# Patient Record
Sex: FEMALE | HISPANIC OR LATINO | URBAN - METROPOLITAN AREA
[De-identification: names, ages, dates, MRNs, and addresses within clinical notes are randomized per-mention and may not be internally consistent; named-entity substitution may affect disease eponyms.]

---

## 2023-01-26 ENCOUNTER — EMERGENCY (EMERGENCY)
Facility: HOSPITAL | Age: 68
LOS: 1 days | Discharge: HOME | End: 2023-01-26
Attending: EMERGENCY MEDICINE
Payer: MEDICARE

## 2023-01-26 VITALS
OXYGEN SATURATION: 97 % | HEART RATE: 53 BPM | HEIGHT: 63 IN | TEMPERATURE: 99 F | RESPIRATION RATE: 16 BRPM | SYSTOLIC BLOOD PRESSURE: 159 MMHG | WEIGHT: 128.09 LBS | DIASTOLIC BLOOD PRESSURE: 63 MMHG

## 2023-01-26 VITALS
TEMPERATURE: 98 F | SYSTOLIC BLOOD PRESSURE: 150 MMHG | RESPIRATION RATE: 17 BRPM | OXYGEN SATURATION: 98 % | DIASTOLIC BLOOD PRESSURE: 70 MMHG | HEART RATE: 59 BPM

## 2023-01-26 LAB
ALBUMIN SERPL ELPH-MCNC: 4.2 G/DL — SIGNIFICANT CHANGE UP (ref 3.3–5)
ALP SERPL-CCNC: 88 U/L — SIGNIFICANT CHANGE UP (ref 40–120)
ALT FLD-CCNC: 10 U/L — SIGNIFICANT CHANGE UP (ref 10–45)
ANION GAP SERPL CALC-SCNC: 12 MMOL/L — SIGNIFICANT CHANGE UP (ref 5–17)
APTT BLD: 28.8 SEC — SIGNIFICANT CHANGE UP (ref 27.5–35.5)
AST SERPL-CCNC: 18 U/L — SIGNIFICANT CHANGE UP (ref 10–40)
BASOPHILS # BLD AUTO: 0.03 K/UL — SIGNIFICANT CHANGE UP (ref 0–0.2)
BASOPHILS NFR BLD AUTO: 0.6 % — SIGNIFICANT CHANGE UP (ref 0–2)
BILIRUB SERPL-MCNC: 0.2 MG/DL — SIGNIFICANT CHANGE UP (ref 0.2–1.2)
BLD GP AB SCN SERPL QL: NEGATIVE — SIGNIFICANT CHANGE UP
BUN SERPL-MCNC: 8 MG/DL — SIGNIFICANT CHANGE UP (ref 7–23)
CALCIUM SERPL-MCNC: 9.7 MG/DL — SIGNIFICANT CHANGE UP (ref 8.4–10.5)
CHLORIDE SERPL-SCNC: 105 MMOL/L — SIGNIFICANT CHANGE UP (ref 96–108)
CO2 SERPL-SCNC: 22 MMOL/L — SIGNIFICANT CHANGE UP (ref 22–31)
CREAT SERPL-MCNC: 0.6 MG/DL — SIGNIFICANT CHANGE UP (ref 0.5–1.3)
EGFR: 100 ML/MIN/1.73M2 — SIGNIFICANT CHANGE UP
EOSINOPHIL # BLD AUTO: 0.07 K/UL — SIGNIFICANT CHANGE UP (ref 0–0.5)
EOSINOPHIL NFR BLD AUTO: 1.4 % — SIGNIFICANT CHANGE UP (ref 0–6)
FLUAV AG NPH QL: SIGNIFICANT CHANGE UP
FLUBV AG NPH QL: SIGNIFICANT CHANGE UP
GLUCOSE SERPL-MCNC: 111 MG/DL — HIGH (ref 70–99)
HCT VFR BLD CALC: 37.5 % — SIGNIFICANT CHANGE UP (ref 34.5–45)
HGB BLD-MCNC: 12.1 G/DL — SIGNIFICANT CHANGE UP (ref 11.5–15.5)
IMM GRANULOCYTES NFR BLD AUTO: 0.6 % — SIGNIFICANT CHANGE UP (ref 0–0.9)
INR BLD: 1.03 RATIO — SIGNIFICANT CHANGE UP (ref 0.88–1.16)
LYMPHOCYTES # BLD AUTO: 1.62 K/UL — SIGNIFICANT CHANGE UP (ref 1–3.3)
LYMPHOCYTES # BLD AUTO: 31.5 % — SIGNIFICANT CHANGE UP (ref 13–44)
MCHC RBC-ENTMCNC: 28.9 PG — SIGNIFICANT CHANGE UP (ref 27–34)
MCHC RBC-ENTMCNC: 32.3 GM/DL — SIGNIFICANT CHANGE UP (ref 32–36)
MCV RBC AUTO: 89.7 FL — SIGNIFICANT CHANGE UP (ref 80–100)
MONOCYTES # BLD AUTO: 0.36 K/UL — SIGNIFICANT CHANGE UP (ref 0–0.9)
MONOCYTES NFR BLD AUTO: 7 % — SIGNIFICANT CHANGE UP (ref 2–14)
NEUTROPHILS # BLD AUTO: 3.04 K/UL — SIGNIFICANT CHANGE UP (ref 1.8–7.4)
NEUTROPHILS NFR BLD AUTO: 58.9 % — SIGNIFICANT CHANGE UP (ref 43–77)
NRBC # BLD: 0 /100 WBCS — SIGNIFICANT CHANGE UP (ref 0–0)
PLATELET # BLD AUTO: 229 K/UL — SIGNIFICANT CHANGE UP (ref 150–400)
POTASSIUM SERPL-MCNC: 4.3 MMOL/L — SIGNIFICANT CHANGE UP (ref 3.5–5.3)
POTASSIUM SERPL-SCNC: 4.3 MMOL/L — SIGNIFICANT CHANGE UP (ref 3.5–5.3)
PROT SERPL-MCNC: 7 G/DL — SIGNIFICANT CHANGE UP (ref 6–8.3)
PROTHROM AB SERPL-ACNC: 11.9 SEC — SIGNIFICANT CHANGE UP (ref 10.5–13.4)
RBC # BLD: 4.18 M/UL — SIGNIFICANT CHANGE UP (ref 3.8–5.2)
RBC # FLD: 12.8 % — SIGNIFICANT CHANGE UP (ref 10.3–14.5)
RH IG SCN BLD-IMP: POSITIVE — SIGNIFICANT CHANGE UP
RSV RNA NPH QL NAA+NON-PROBE: SIGNIFICANT CHANGE UP
SARS-COV-2 RNA SPEC QL NAA+PROBE: SIGNIFICANT CHANGE UP
SODIUM SERPL-SCNC: 139 MMOL/L — SIGNIFICANT CHANGE UP (ref 135–145)
WBC # BLD: 5.15 K/UL — SIGNIFICANT CHANGE UP (ref 3.8–10.5)
WBC # FLD AUTO: 5.15 K/UL — SIGNIFICANT CHANGE UP (ref 3.8–10.5)

## 2023-01-26 PROCEDURE — 87637 SARSCOV2&INF A&B&RSV AMP PRB: CPT

## 2023-01-26 PROCEDURE — 86900 BLOOD TYPING SEROLOGIC ABO: CPT

## 2023-01-26 PROCEDURE — 85730 THROMBOPLASTIN TIME PARTIAL: CPT

## 2023-01-26 PROCEDURE — 80053 COMPREHEN METABOLIC PANEL: CPT

## 2023-01-26 PROCEDURE — 99284 EMERGENCY DEPT VISIT MOD MDM: CPT

## 2023-01-26 PROCEDURE — 93005 ELECTROCARDIOGRAM TRACING: CPT

## 2023-01-26 PROCEDURE — 86850 RBC ANTIBODY SCREEN: CPT

## 2023-01-26 PROCEDURE — 36415 COLL VENOUS BLD VENIPUNCTURE: CPT

## 2023-01-26 PROCEDURE — 85025 COMPLETE CBC W/AUTO DIFF WBC: CPT

## 2023-01-26 PROCEDURE — 85610 PROTHROMBIN TIME: CPT

## 2023-01-26 PROCEDURE — 86901 BLOOD TYPING SEROLOGIC RH(D): CPT

## 2023-01-26 NOTE — ED PROVIDER NOTE - PHYSICAL EXAMINATION
Gen: NAD, non-toxic appearing  Head: normal appearing  HEENT: normal conjunctiva  PERRL  Conjunctiva are normal appearing  CN III, IV, and VI are intact    there is a definite visual acuity over the medial upper and lower quadrants on the left-hand side.  Vision is otherwise intact, normal vision on the right eye.  Lung: no respiratory distress, speaking in full sentences     CV: warm and well perfused extremities  Abd: soft, non distended, non tender   MSK: no visible deformities  Neuro:   Alert and grossly oriented, following commands  CN II-XII intact  5/5 strength in x4 extremities   Intact sensation to light touch in x4 extremities  Finger to Nose testing normal  Skin: No terence rashes

## 2023-01-26 NOTE — ED PROVIDER NOTE - PATIENT PORTAL LINK FT
You can access the FollowMyHealth Patient Portal offered by [facility] by registering at the following website: [email or web address]. By joining [facility]’s FollowMyHealth portal, you will also be able to view your health information using other applications (apps) compatible with our system.

## 2023-01-26 NOTE — ED ADULT NURSE NOTE - NSIMPLEMENTINTERV_GEN_ALL_ED
Implemented All Universal Safety Interventions:  [place] to call system. Call bell, personal items and telephone within reach. Instruct patient to call for assistance. Room bathroom lighting operational. Non-slip footwear when patient is off stretcher. Physically safe environment: no spills, clutter or unnecessary equipment. Stretcher in lowest position, wheels locked, appropriate side rails in place.

## 2023-01-26 NOTE — ED PROVIDER NOTE - ATTENDING CONTRIBUTION TO CARE
------------ATTENDING NOTE------------  pt w/ family sent to ED by outpt Ophthalmology for concerns of 24 hrs of progressing L retinal detachment, pt c/o painless OS hemianopsia, c/w [facility] Ophthalmology on arrival -->  - Brent Blair MD   ---------------------------------------------- ------------ATTENDING NOTE------------  pt w/ family sent to ED by outpt Ophthalmology for concerns of 24 hrs of progressing L retinal detachment, pt c/o painless OS hemianopsia, c/w [facility] Ophthalmology on arrival --> medically cleared by labs / Hx, evaluated by Ophthalmology and scheduled for surgery tomorrow, multiple in depth dw pt and Ophthalmopathology about ddx, tx, unable for surgery today, return instructions.  - Brent Blair MD   ----------------------------------------------

## 2023-01-26 NOTE — ED PROVIDER NOTE - OBJECTIVE STATEMENT
67-year-old female, history of hypertension, presenting with a chief complaint of acute vision loss ongoing since yesterday morning.  Located over her left medial visual field.  Minimal headache associated with this, no other active symptoms.  No weakness or numbness is reported.  She was seen by her ophthalmologist this afternoon, who referred her here after finding evidence of retinal detachment.  She denies any chronic ocular disease, however she does see a ophthalmologist yearly.  Does not take any regular drops.  No history of eye surgery.  No allergies to medications.  She is on daily and enalapril, no other medications.

## 2023-01-26 NOTE — ED PROVIDER NOTE - NSFOLLOWUPINSTRUCTIONS_ED_ALL_ED_FT
*** MEDICALLY CLEARED FOR SURGERY ***    See Eye Clinic tomorrow for scheduled surgery -- call early to discuss.    No eating/drinking after midnight tonight.    See RETINAL DETACHMENT information and return instructions given to you.    Seek immediate medical care for new / worsening symptoms / concerns.

## 2023-01-26 NOTE — CONSULT NOTE ADULT - ASSESSMENT
Assessment and Recommendations:  67y female with a past medical history/ocular history of *** consulted for ***, found to have ***    Seen and discussed with ***.    Outpatient Follow-up: Patient should follow-up with his/her ophthalmologist or with [facility] [place] Department of Ophthalmology within 1 week of after discharge at:    [address]  [place] [address]  [phone]    Candice Reyna MD, PGY-2  Contact: [employer] Teams     Assessment and Recommendations:  67y female with a past medical history/ocular history of HTN consulted for concern for RD, found to have Mac on RD in the left eye.     #Macula-on retinal detachment, left eye  - Superotemporal tear with detachment from 10:00 to 4:00 seen on DFE  - Patient will need retinal surgery   - Please document medical clearance for surgery   - Please obtain covid vaccine  - Patient instructed to be NPO after midnight in preparation for likely OR tomorrow   - Patient given the option to come to [facility] clinic first thing in the morning or go back to [facility] where she already has an appointment scheduled for tomorrow morning. Patient plans to go to [facility] apt as that office is more convenient for her, and she already sees an ophthalmologist at [facility].  - Patient given phone number and address for [facility] [place] and instructed to call immediately if she is unable to be seen at [facility] for any reason.     Discussed with Dr. Martinez, PGY-4    Outpatient Follow-up: Patient should follow-up with his/her ophthalmologist or with [facility] Department of Ophthalmology within 1 week of after discharge at:    [address]  [place], [place] [id number] [phone]    Candice Reyna MD, PGY-2  Contact: [employer] Teams

## 2023-01-26 NOTE — ED ADULT NURSE NOTE - OBJECTIVE STATEMENT
Pt presents to the ED A&OX4 w daughter at bedside co sudden "dark spots" and "unable to see through" L eye x 10am. Hx htn. Pt endorses mild pain to L eye. Upon assessment, pt unable to open L eye. Pending optho consult. Denies fevers, chills, headaches, chest pain.

## 2023-01-26 NOTE — CONSULT NOTE ADULT - SUBJECTIVE AND OBJECTIVE BOX
[facility] [place] DEPARTMENT OF OPHTHALMOLOGY - INITIAL ADULT CONSULT  -----------------------------------------------------------------------------  Candice Reyna MD, PGY-2  Contact: TEAMS  -----------------------------------------------------------------------------    HPI:    Interval History: ***    PMH: ***  POcHx: denies surg/laser  FH: denies glc/amd  Social History: denies etoh/tobacco  Ophthalmic Medications: none  Allergies: NKDA    Review of Systems:  Constitutional: No fever, chills  Eyes: No blurry vision, flashes, floaters, FBS, erythema, discharge, double vision, OU  Neuro: No tremors  Cardiovascular: No chest pain, palpitations  Respiratory: No SOB, no cough  GI: No nausea, vomiting, abdominal pain  : No dysuria  Skin: no rash  Psych: no depression  Endocrine: no polyuria, polydipsia  Heme/lymph: no swelling    VITALS: T(C): 37 (01-26-23 @ 18:29)  T(F): 98.6 (01-26-23 @ 18:29), Max: 98.6 (01-26-23 @ 18:29)  HR: 53 (01-26-23 @ 18:29) (53 - 53)  BP: 159/63 (01-26-23 @ 18:29) (159/63 - 159/63)  RR:  (16 - 16)  SpO2:  (97% - 97%)  Wt(kg): --  General: AAO x 3, appropriate mood and affect    Ophthalmology Exam:  Visual acuity (sc): 20/20 OU  Pupils: PERRL OU, no APD  Ttono: 16 OU  Extraocular movements (EOMs): Full OU, no pain, no diplopia  Confrontational Visual Field (CVF): Full OU  Color Plates: 12/12 OU    Pen Light Exam (PLE)  External: Flat OU  Lids/Lashes/Lacrimal Ducts: Flat OU    Sclera/Conjunctiva: W+Q OU  Cornea: Cl OU  Anterior Chamber: D+F OU    Iris: Flat OU  Lens: Cl OU    Fundus Exam: dilated with 1% tropicamide and 2.5% phenylephrine  Approval obtained from primary team for dilation  Patient aware that pupils can remained dilated for at least 4-6 hours  Exam performed with 20D lens    Vitreous: wnl OU  Disc, cup/disc: sharp and pink, 0.4 OU  Macula: wnl OU  Vessels: wnl OU  Periphery: wnl OU    Labs/Imaging:  *** [facility] DEPARTMENT OF OPHTHALMOLOGY - INITIAL ADULT CONSULT  -----------------------------------------------------------------------------  Candice Reyna MD, PGY-2  Contact: TEAMS  -----------------------------------------------------------------------------    HPI: 67-year-old female, history of hypertension, presenting with a chief complaint of acute vision loss ongoing since yesterday morning.  Located over her left medial visual field.  Minimal headache associated with this, no other active symptoms.  No weakness or numbness is reported.  She was seen by her ophthalmologist this afternoon, who referred her here after finding evidence of retinal detachment.  She denies any chronic ocular disease, however she does see a ophthalmologist yearly.  Does not take any regular drops.  No history of eye surgery.  No allergies to medications.  She is on daily and enalapril, no other medications.    Interval History: The patient first noticed black shadows in her left eye on 1/25/23 in the morning. The shadows persisted throughout the day. In the evening the patient noticed a "black crescent" in her left nasal visual field. In the morning on 1/26/23 the crescent had increased in size. She denies any flashes. She denies any symptoms in her right eye.     PMH: HTN  POcHx: denies surg/laser  FH: denies glc/amd  Social History: denies etoh/tobacco  Ophthalmic Medications: none  Allergies: NKDA    Review of Systems:  Constitutional: No fever, chills  Eyes: See Interval history  Neuro: No tremors  Cardiovascular: No chest pain, palpitations  Respiratory: No SOB, no cough  GI: No nausea, vomiting, abdominal pain  : No dysuria  Skin: no rash  Psych: no depression  Endocrine: no polyuria, polydipsia  Heme/lymph: no swelling    VITALS: T(C): 37 (01-26-23 @ 18:29)  T(F): 98.6 (01-26-23 @ 18:29), Max: 98.6 (01-26-23 @ 18:29)  HR: 53 (01-26-23 @ 18:29) (53 - 53)  BP: 159/63 (01-26-23 @ 18:29) (159/63 - 159/63)  RR:  (16 - 16)  SpO2:  (97% - 97%)  Wt(kg): --  General: AAO x 3, appropriate mood and affect    Ophthalmology Exam:  Visual acuity (sc): 20/25 OD, 20/50 OS, eccentrically.   Pupils: PERRL OU, no APD  Ttono: 14, 13  Extraocular movements (EOMs): Full OU, no pain, no diplopia  Confrontational Visual Field (CVF): Full OD. COnstricted inferonasally OS, otherwise full OS.  Color Plates: 12/12 OD, 10/12 OS    Slit lamp exam:  External: Flat OU  Lids/Lashes/Lacrimal Ducts: Flat OU    Sclera/Conjunctiva: W+Q OU  Cornea: Cl OU  Anterior Chamber: D+F OU    Iris: Flat OU  Lens: 1+ NS OD, 1+ NS and 1+ PSC OS    Fundus Exam: dilated with 1% tropicamide and 2.5% phenylephrine  Approval obtained from primary team for dilation  Patient aware that pupils can remained dilated for at least 4-6 hours  Exam performed with 20D lens    Vitreous: wnl OU  Disc, cup/disc: sharp and pink, 0.3 OU  Macula: flat OD. Possible subretinal fluid extending to fovea OS.   Vessels: wnl OU  Periphery: No holes, tears, detachments OD. Superotemporal tear with detachment extending from 10:00 to 4:00    Labs/Imaging:  None

## 2023-01-26 NOTE — ED PROVIDER NOTE - NS ED ROS FT
GENERAL: no fever  EYES: + L vision loss  HEENT: no neck pain  CARDIAC: no chest pain  PULMONARY: no SOB  GI: no abdominal pain  : no dysuria  SKIN: no rashes  NEURO: no headache  MSK: no new joint pain